# Patient Record
Sex: FEMALE | Race: WHITE | Employment: FULL TIME | URBAN - METROPOLITAN AREA
[De-identification: names, ages, dates, MRNs, and addresses within clinical notes are randomized per-mention and may not be internally consistent; named-entity substitution may affect disease eponyms.]

---

## 2021-11-03 ENCOUNTER — HOSPITAL ENCOUNTER (EMERGENCY)
Age: 36
Discharge: HOME | End: 2021-11-03
Attending: EMERGENCY MEDICINE

## 2021-11-03 ENCOUNTER — APPOINTMENT (OUTPATIENT)
Dept: GENERAL RADIOLOGY | Age: 36
End: 2021-11-03
Attending: EMERGENCY MEDICINE

## 2021-11-03 VITALS
DIASTOLIC BLOOD PRESSURE: 90 MMHG | TEMPERATURE: 98.1 F | RESPIRATION RATE: 18 BRPM | WEIGHT: 235.45 LBS | SYSTOLIC BLOOD PRESSURE: 163 MMHG | BODY MASS INDEX: 37.84 KG/M2 | HEIGHT: 66 IN | HEART RATE: 93 BPM | OXYGEN SATURATION: 100 %

## 2021-11-03 DIAGNOSIS — S62.643A CLOSED NONDISPLACED FRACTURE OF PROXIMAL PHALANX OF LEFT MIDDLE FINGER, INITIAL ENCOUNTER: Primary | ICD-10-CM

## 2021-11-03 PROCEDURE — 74011250637 HC RX REV CODE- 250/637: Performed by: EMERGENCY MEDICINE

## 2021-11-03 PROCEDURE — 73130 X-RAY EXAM OF HAND: CPT

## 2021-11-03 PROCEDURE — 99282 EMERGENCY DEPT VISIT SF MDM: CPT

## 2021-11-03 RX ORDER — OXYCODONE HYDROCHLORIDE 5 MG/1
5 TABLET ORAL
Qty: 9 TABLET | Refills: 0 | Status: SHIPPED | OUTPATIENT
Start: 2021-11-03 | End: 2021-11-06

## 2021-11-03 RX ORDER — IBUPROFEN 400 MG/1
800 TABLET ORAL
Status: COMPLETED | OUTPATIENT
Start: 2021-11-03 | End: 2021-11-03

## 2021-11-03 RX ADMIN — IBUPROFEN 800 MG: 400 TABLET, FILM COATED ORAL at 06:56

## 2021-11-03 NOTE — ED PROVIDER NOTES
EMERGENCY DEPARTMENT HISTORY AND PHYSICAL EXAM      Date: 11/3/2021  Patient Name: Sherlyn Weber    History of Presenting Illness     Chief Complaint   Patient presents with    Finger Pain     Pt ambulatory to triage with c/o L hand 3rd finger swelling, pain after hitting it on a door wreath; obvious swelling to finger; pt took x 2 extra strength tylenol around 0530       History Provided By: Patient    HPI: Sherlyn Weber, 39 y.o. female presents to the ED, candidate baseline, here with third finger swelling after getting it caught on a door wreath yesterday. Patient was lifting a door wreath off the door and turned away as she was lifting it and the wreath did not come loose, causing her third finger to become caught in the roof itself. She initially noticed pain as well as a knot along the medial base of the finger. She subsequently developed swelling and some increased pain at the base of her third finger. She is removed her ring from her fourth finger as of yesterday. She has no history of any previous injury of the left hand. She has iced her hand and took extra strength Tylenol. She denies any fever, pain at the shoulder, upper arm, elbow or forearm or wrist.    The patient has no past medical history and is a current everyday smoker. There are no other complaints, changes, or physical findings at this time. PCP: None    No current facility-administered medications on file prior to encounter. Current Outpatient Medications on File Prior to Encounter   Medication Sig Dispense Refill    prednisone (STERAPRED) 5 mg dose pack Take  by mouth. See administration instruction per 5mg dose pack 21 Tab 0    hydrocodone-acetaminophen (LORTAB ELIXIR) 7.5-500 mg/15 mL oral solution Take 10 mL by mouth four (4) times daily as needed for Pain. 120 mL 0       Past History     Past Medical History:  No past medical history on file. Past Surgical History:  No past surgical history on file.     Family History:  No family history on file. Social History:  Social History     Tobacco Use    Smoking status: Current Every Day Smoker     Packs/day: 0.50    Smokeless tobacco: Never Used   Substance Use Topics    Alcohol use: Yes    Drug use: Not on file       Allergies:  No Known Allergies      Review of Systems   Review of Systems   Constitutional: Negative for fever. Musculoskeletal: Negative for back pain and neck pain. Hematological:        Patient not on any anticoagulation. All other systems reviewed and are negative. Physical Exam   Physical Exam   Vital signs and nursing notes reviewed    CONSTITUTIONAL: Alert, in mild distress; well-developed; well-nourished. HEAD:  Normocephalic, atraumatic  EYES: PERRL; EOM's intact. Neck:  Supple. trachea is midline. RESP: Chest clear, equal breath sounds. - W/R/R  CV: S1 and S2 WNL; No murmurs, gallops or rubs. 2+ radial and DP pulses bilaterally. UPPER EXT: Third finger with fusiform swelling from the base of the third finger extending to the PIP joint, cap refill less than 3 seconds, intact light touch sensation in the finger, limited flexion from the PIP out to the fingertip. LOWER EXT: No edema, no calf tenderness. Atraumatic. NEURO: Alert and oriented x3, 5/5 strength and light touch sensation intact in bilateral upper and lower extremities. Normal gait, normal speech. SKIN: No rashes; Warm and dry  PSYCH: Normal mood, normal affect    Diagnostic Study Results     Labs -   No results found for this or any previous visit (from the past 12 hour(s)). Radiologic Studies -   XR HAND LT MIN 3 V   Final Result   Pathological proximal phalanx third digit fracture. CT Results  (Last 48 hours)    None        CXR Results  (Last 48 hours)    None          Medical Decision Making   I am the first provider for this patient.     I reviewed the vital signs, available nursing notes, past medical history, past surgical history, family history and social history. Vital Signs-Reviewed the patient's vital signs. Patient Vitals for the past 12 hrs:   Temp Pulse Resp BP SpO2   11/03/21 0627 98.1 °F (36.7 °C) 93 18 (!) 163/90 100 %       Records Reviewed: Nursing Notes    Provider Notes (Medical Decision Making):   58-year-old female here with third finger swelling. Lower suspicion for fracture or dislocation but will get x-ray. Consider ligamentous, tendinous injury or venous injury. No arterial compromise to the finger. X-ray to rule out fracture, plan for splint, ice, elevation, NSAID and Ortho follow-up. ED Course:   Initial assessment performed. The patients presenting problems have been discussed, and they are in agreement with the care plan formulated and outlined with them. I have encouraged them to ask questions as they arise throughout their visit. Disposition:  Discharge    Discharge Note:  8:10 AM    The pt is ready for discharge. The pt's signs, symptoms, diagnosis, and discharge instructions have been discussed and pt has conveyed their understanding. The pt is to follow up as recommended or return to ER should their symptoms worsen. Plan has been discussed and pt is in agreement. DISCHARGE PLAN:  1. Current Discharge Medication List        2. Follow-up Information     Follow up With Specialties Details Why Sisi Louie MD Hand Surgery  Please call Dr. Brigida Cross office today for a follow-up this week. [address]  [address] [facility],[address]  [address] [facility]  [phone]      [facility] [place] EM[facility]ENCY DEPT Emergency Medicine  If symptoms worsen including uncontrolled pain, fever or other new concerning symptoms. [address] [place] [address]  [phone]        3. Return to ED if worse     Diagnosis     Clinical Impression:   1.  Closed nondisplaced fracture of proximal phalanx of left middle finger, initial encounter        Attestations:    Ester Olson MD    Please note that this dictation was completed with [employer], the computer voice recognition software. Quite often unanticipated grammatical, syntax, homophones, and other interpretive errors are inadvertently transcribed by the computer software. Please disregard these errors. Please excuse any errors that have escaped final proofreading. Thank you.

## 2021-11-03 NOTE — DISCHARGE INSTRUCTIONS
You are seen here in the emergency department for pain and swelling in the left middle finger. Today's x-ray did reveal a broken bone in your left middle finger. Additionally, there is possible concern for a tendon or ligament injury or possibly a vein injury. You were provided a splint today which he should use until you have follow-up with an orthopedic specialist for your hand. Until then. Please ice and elevate your finger above the level of your heart. Ice can be applied 20 minutes at a time for 5-6 times a day. Please also use ibuprofen 600 mg every 6 hours.